# Patient Record
Sex: FEMALE | Race: WHITE
[De-identification: names, ages, dates, MRNs, and addresses within clinical notes are randomized per-mention and may not be internally consistent; named-entity substitution may affect disease eponyms.]

---

## 2020-02-10 ENCOUNTER — HOSPITAL ENCOUNTER (EMERGENCY)
Dept: HOSPITAL 62 - ER | Age: 70
Discharge: HOME | End: 2020-02-10
Payer: MEDICARE

## 2020-02-10 VITALS — SYSTOLIC BLOOD PRESSURE: 160 MMHG | DIASTOLIC BLOOD PRESSURE: 73 MMHG

## 2020-02-10 DIAGNOSIS — R07.81: ICD-10-CM

## 2020-02-10 DIAGNOSIS — E78.00: ICD-10-CM

## 2020-02-10 DIAGNOSIS — M54.6: Primary | ICD-10-CM

## 2020-02-10 DIAGNOSIS — I10: ICD-10-CM

## 2020-02-10 DIAGNOSIS — Z90.710: ICD-10-CM

## 2020-02-10 DIAGNOSIS — Z88.2: ICD-10-CM

## 2020-02-10 DIAGNOSIS — Z90.49: ICD-10-CM

## 2020-02-10 LAB
ADD MANUAL DIFF: NO
ALBUMIN SERPL-MCNC: 3.8 G/DL (ref 3.5–5)
ALP SERPL-CCNC: 89 U/L (ref 38–126)
ANION GAP SERPL CALC-SCNC: 10 MMOL/L (ref 5–19)
AST SERPL-CCNC: 29 U/L (ref 14–36)
BASOPHILS # BLD AUTO: 0.1 10^3/UL (ref 0–0.2)
BASOPHILS NFR BLD AUTO: 1.1 % (ref 0–2)
BILIRUB DIRECT SERPL-MCNC: 0.2 MG/DL (ref 0–0.4)
BILIRUB SERPL-MCNC: 0.3 MG/DL (ref 0.2–1.3)
BUN SERPL-MCNC: 12 MG/DL (ref 7–20)
CALCIUM: 9.2 MG/DL (ref 8.4–10.2)
CHLORIDE SERPL-SCNC: 97 MMOL/L (ref 98–107)
CO2 SERPL-SCNC: 26 MMOL/L (ref 22–30)
EOSINOPHIL # BLD AUTO: 0.1 10^3/UL (ref 0–0.6)
EOSINOPHIL NFR BLD AUTO: 0.8 % (ref 0–6)
ERYTHROCYTE [DISTWIDTH] IN BLOOD BY AUTOMATED COUNT: 13.6 % (ref 11.5–14)
GLUCOSE SERPL-MCNC: 85 MG/DL (ref 75–110)
HCT VFR BLD CALC: 40 % (ref 36–47)
HGB BLD-MCNC: 13.4 G/DL (ref 12–15.5)
INR PPP: 0.92
LYMPHOCYTES # BLD AUTO: 2.8 10^3/UL (ref 0.5–4.7)
LYMPHOCYTES NFR BLD AUTO: 24 % (ref 13–45)
MCH RBC QN AUTO: 30.4 PG (ref 27–33.4)
MCHC RBC AUTO-ENTMCNC: 33.6 G/DL (ref 32–36)
MCV RBC AUTO: 90 FL (ref 80–97)
MONOCYTES # BLD AUTO: 0.9 10^3/UL (ref 0.1–1.4)
MONOCYTES NFR BLD AUTO: 7.6 % (ref 3–13)
NEUTROPHILS # BLD AUTO: 7.8 10^3/UL (ref 1.7–8.2)
NEUTS SEG NFR BLD AUTO: 66.5 % (ref 42–78)
PLATELET # BLD: 335 10^3/UL (ref 150–450)
POTASSIUM SERPL-SCNC: 4.1 MMOL/L (ref 3.6–5)
PROT SERPL-MCNC: 6.9 G/DL (ref 6.3–8.2)
PROTHROMBIN TIME: 12.3 SEC (ref 11.4–15.4)
RBC # BLD AUTO: 4.42 10^6/UL (ref 3.72–5.28)
TOTAL CELLS COUNTED % (AUTO): 100 %
WBC # BLD AUTO: 11.7 10^3/UL (ref 4–10.5)

## 2020-02-10 PROCEDURE — 93005 ELECTROCARDIOGRAM TRACING: CPT

## 2020-02-10 PROCEDURE — 84484 ASSAY OF TROPONIN QUANT: CPT

## 2020-02-10 PROCEDURE — 36415 COLL VENOUS BLD VENIPUNCTURE: CPT

## 2020-02-10 PROCEDURE — 80053 COMPREHEN METABOLIC PANEL: CPT

## 2020-02-10 PROCEDURE — 71275 CT ANGIOGRAPHY CHEST: CPT

## 2020-02-10 PROCEDURE — 99284 EMERGENCY DEPT VISIT MOD MDM: CPT

## 2020-02-10 PROCEDURE — 85025 COMPLETE CBC W/AUTO DIFF WBC: CPT

## 2020-02-10 PROCEDURE — 85610 PROTHROMBIN TIME: CPT

## 2020-02-10 PROCEDURE — 93010 ELECTROCARDIOGRAM REPORT: CPT

## 2020-02-10 PROCEDURE — 85730 THROMBOPLASTIN TIME PARTIAL: CPT

## 2020-02-10 NOTE — EKG REPORT
SEVERITY:- ABNORMAL ECG -

SINUS RHYTHM

BORDERLINE LEFT AXIS DEVIATION

NONSPECIFICANTEROSEPTAL ST-T CHANGES

:

Confirmed by: Zach La MD 10-Feb-2020 20:32:26

## 2020-02-10 NOTE — ER DOCUMENT REPORT
ED Medical Screen (RME)





- General


Stated Complaint: BACK PAIN


Time Seen by Provider: 02/10/20 13:42


Primary Care Provider: 


ANDERS MINAYA MD [Primary Care Provider] - Follow up as needed


Notes: 





69-year-old female was sent from her primary care doctors for PE rule out.  

Patient states she has been having left mid back pain and pain upon deep 

breathing since Saturday.  Patient states she recently traveled long distance.  

Patient states she was being seen at her primary care doctor for a bladder 

infection and was sent here due to that.  Patient states that while she was 

being checked at her primary care doctors her heart rate did go up.  Lungs clear

to auscultation bilaterally.  Regular rate and rhythm.





I have greeted and performed a rapid initial assessment of this patient. A 

comprehensive ED assessment and evaluation of the patient, analysis of test 

results and completion of the medical decision making process with be conducted 

by additional ED providers.





- Related Data


Allergies/Adverse Reactions: 


                                        





Sulfa (Sulfonamide Antibiotics) Allergy (Verified 10/01/14 16:19)


   











Past Medical History





- Past Medical History


Cardiac Medical History: Reports: Hx Hypercholesterolemia, Hx Hypertension


Past Surgical History: Reports: Hx Cholecystectomy, Hx Hysterectomy





- Immunizations


Hx Diphtheria, Pertussis, Tetanus Vaccination: Yes





Physical Exam





- Vital signs


Vitals: 





                                        











Temp Pulse Resp BP Pulse Ox


 


 98.7 F   83   16   145/66 H  95 


 


 02/10/20 12:45  02/10/20 12:45  02/10/20 12:45  02/10/20 12:45  02/10/20 12:45














Course





- Vital Signs


Vital signs: 





                                        











Temp Pulse Resp BP Pulse Ox


 


 98.7 F   83   16   145/66 H  95 


 


 02/10/20 12:45  02/10/20 12:45  02/10/20 12:45  02/10/20 12:45  02/10/20 12:45














Doctor's Discharge





- Discharge


Referrals: 


ANDERS MINAYA MD [Primary Care Provider] - Follow up as needed

## 2020-02-10 NOTE — ER DOCUMENT REPORT
ED General





- General


Chief Complaint: Back Pain


Stated Complaint: BACK PAIN


Time Seen by Provider: 02/10/20 13:42


Primary Care Provider: 


ANDERS MINAYA MD [ACTIVE STAFF] - Follow up as needed


Mode of Arrival: Ambulatory


Information source: Patient





- [facility]


Notes: 





Patient presents with upper back pain.  In mid back pain.  She went and saw her 

primary provider today who referred her here for a CTA.  She has had some recent

traveling to the [facility].  Patient has no previous history of PEs or DVTs.  

Patient has had no significant cough cold or congestion.  The pain is worse with

a deep breath or movement.  Is better with rest.  It is a sharp pain.  Is mild 

to moderate.  Is located in the center of the upper part of her back.  It does 

radiate across her back.  No chest pain however.  No abdominal pain.  No 

significant leg swelling.





- Related Data


Allergies/Adverse Reactions: 


                                        





Sulfa (Sulfonamide Antibiotics) Allergy (Verified 10/01/14 16:19)


   








Home Medications: pt unable to recall at this time





Past Medical History





- General


Information source: Patient





- Social History


Smoking Status: Never Smoker


Frequency of alcohol use: None


Drug Abuse: None


Family History: Reviewed & Not Pertinent


Patient has suicidal ideation: No


Patient has homicidal ideation: No





- Past Medical History


Cardiac Medical History: Reports: Hx Hypercholesterolemia, Hx Hypertension


Past Surgical History: Reports: Hx Cholecystectomy, Hx Hysterectomy





- Immunizations


Hx Diphtheria, Pertussis, Tetanus Vaccination: Yes





Review of Systems





- Review of Systems


Constitutional: denies: Chills, Fever


Cardiovascular: denies: Chest pain, Palpitations


Respiratory: Short of breath.  denies: Cough


-: Yes All other systems reviewed and negative





Physical Exam





- Vital signs


Vitals: 





                                        











Temp Pulse Resp BP Pulse Ox


 


 98.7 F   83   16   145/66 H  95 


 


 02/10/20 12:45  02/10/20 12:45  02/10/20 12:45  02/10/20 12:45  02/10/20 12:45











Interpretation: Normal





- General


General appearance: Appears well, Alert





- HEENT


Head: Normocephalic, Atraumatic


Eyes: Normal


Pupils: PERRL





- Respiratory


Respiratory status: No respiratory distress


Chest status: Nontender


Breath sounds: Normal


Chest palpation: Normal





- Cardiovascular


Rhythm: Regular


Heart sounds: Normal auscultation


Murmur: No





- Abdominal


Inspection: Normal


Distension: No distension


Bowel sounds: Normal


Tenderness: Nontender


Organomegaly: No organomegaly





- Back


Back: Normal, Nontender





- Extremities


General upper extremity: Normal inspection, Nontender, Normal color, Normal ROM,

Normal temperature


General lower extremity: Normal inspection, Nontender, Normal color, Normal ROM,

Normal temperature, Normal weight bearing.  No: Carl's sign





- Neurological


Neuro grossly intact: Yes


Cognition: Normal


Orientation: AAOx4


Saud Coma Scale Eye Opening: Spontaneous


[place] Coma Scale Verbal: Oriented


Saud Coma Scale Motor: Obeys Commands


Saud Coma Scale Total: 15


Speech: Normal


Motor strength normal: LUE, RUE, LLE, RLE


Sensory: Normal





- Psychological


Associated symptoms: Normal affect, Normal mood





- Skin


Skin Temperature: Warm


Skin Moisture: Dry


Skin Color: Normal





Course





- Vital Signs


Vital signs: 





                                        











Temp Pulse Resp BP Pulse Ox


 


 98.7 F   83   16   145/66 H  95 


 


 02/10/20 12:45  02/10/20 12:45  02/10/20 12:45  02/10/20 12:45  02/10/20 12:45














- Laboratory


Result Diagrams: 


                                 02/10/20 16:10





                                 02/10/20 16:10


Laboratory results interpreted by me: 





                                        











  02/10/20 02/10/20





  16:10 16:10


 


WBC   11.7 H


 


Sodium  132.7 L 


 


Chloride  97 L 


 


Creatinine  0.48 L 














- Diagnostic Test


Radiology reviewed: Image reviewed, Reports reviewed





- EKG Interpretation by Me


EKG shows normal: Sinus rhythm


Rate: Normal - 67


Rhythm: NSR


Axis/QRS: Left axis deviation.  No: Right axis deviation





Discharge





- Discharge


Clinical Impression: 


Thoracic back pain


Qualifiers:


 Chronicity: acute Back pain laterality: midline Qualified Code(s): M54.6 - Pain

in thoracic spine





Condition: Stable


Disposition: HOME, SELF-CARE


Additional Instructions: 


Please follow-up with your primary care doctor as scheduled


Referrals: 


ANDERS MINAYA MD [ACTIVE STAFF] - Follow up as needed

## 2020-02-10 NOTE — RADIOLOGY REPORT (SQ)
EXAM DESCRIPTION:  CTA CHEST



COMPLETED DATE/TIME:  2/10/2020 5:09 pm



REASON FOR STUDY:  pleuritic pain, recent long distance travel



COMPARISON:  None.



TECHNIQUE:  CT scan of the chest performed using helical scanning technique with dynamic intravenous 
contrast injection.  Images reviewed with lung, soft tissue and bone windows.  Reconstructed coronal 
and sagittal MPR images reviewed.

Additional 3 dimensional post-processing performed to develop Maximal Intensity Projection images (MI
P).  All images stored on PACS.

All CT scanners at this facility use dose modulation, iterative reconstruction, and/or weight based d
osing when appropriate to reduce radiation dose to as low as reasonably achievable (ALARA).

[facility]: Dose Right  [facility]: CareDose    [facility]: Dose Right    [facility]: Teradose 4D    [facility]: Smart Technologies



CONTRAST TYPE AND DOSE:  contrast/concentration: Isovue 350.00 mg/ml; Total Contrast Delivered: 61.0 
ml; Total Saline Delivered: 80.0 ml

Contrast bolus optimized for the pulmonary arteries. Not diagnostic for the aorta.



RENAL FUNCTION:  GFR > 60.



RADIATION DOSE:  CT Rad equipment meets quality standard of care and radiation dose reduction techniq
ues were employed. CTDIvol: 9.9 - 15.0 mGy. DLP: 544 mGy-cm. .



LIMITATIONS:  None.



FINDINGS:  LUNGS AND PLEURA: No pneumothorax.  Scattered subpleural pulmonary nodules, largest in the
 inferior lingula measuring 8 mm.  No pleural effusions.

AORTA AND GREAT VESSELS: No aneurysm.  Contrast bolus not optimized for the aorta.

HEART: No pericardial effusion. No significant coronary artery calcifications.

PULMONARY ARTERIES: No emboli visualized in the main pulmonary arteries or the segmental branches.

HILAR AND MEDIASTINAL STRUCTURES: Scattered nodes, several calcified.

HARDWARE: None in the chest.

UPPER ABDOMEN: No significant findings.  Limited exam.

THYROID AND OTHER SOFT TISSUES: No masses.  No adenopathy.

BONES: No acute or significant finding.

3D MIPS: Confirm above findings.

OTHER: No other significant finding.



IMPRESSION:  No emboli visualized in the main pulmonary arteries or the segmental branches. Scattered
 subpleural pulmonary nodules, largest in the inferior lingula measuring 8 mm, and scattered mediasti
nal nodes, many calcified suggesting old granulomatous disease.



COMMENT:  Quality ID # [id number]: Final reports with documentation of one or more dose reduction techniques
 (e.g., Automated exposure control, adjustment of the mA and/or kV according to patient size, use of 
iterative reconstruction technique)



TECHNICAL DOCUMENTATION:  JOB ID:  [id number]

[id number]

 2011 [employer]- All Rights Reserved



Reading location - IP/workstation name: [employer]